# Patient Record
Sex: FEMALE | Race: OTHER
[De-identification: names, ages, dates, MRNs, and addresses within clinical notes are randomized per-mention and may not be internally consistent; named-entity substitution may affect disease eponyms.]

---

## 2018-02-08 ENCOUNTER — HOSPITAL ENCOUNTER (OUTPATIENT)
Dept: HOSPITAL 31 - C.SDS | Age: 73
Discharge: HOME | End: 2018-02-08
Attending: OBSTETRICS & GYNECOLOGY
Payer: MEDICARE

## 2018-02-08 VITALS — OXYGEN SATURATION: 100 %

## 2018-02-08 VITALS
RESPIRATION RATE: 18 BRPM | SYSTOLIC BLOOD PRESSURE: 137 MMHG | TEMPERATURE: 97 F | DIASTOLIC BLOOD PRESSURE: 90 MMHG | HEART RATE: 63 BPM

## 2018-02-08 VITALS — BODY MASS INDEX: 25.6 KG/M2

## 2018-02-08 DIAGNOSIS — N95.0: Primary | ICD-10-CM

## 2018-02-08 DIAGNOSIS — N85.8: ICD-10-CM

## 2018-02-08 DIAGNOSIS — Z85.3: ICD-10-CM

## 2018-02-08 DIAGNOSIS — E11.9: ICD-10-CM

## 2018-02-08 PROCEDURE — 58558 HYSTEROSCOPY BIOPSY: CPT

## 2018-02-08 PROCEDURE — 88305 TISSUE EXAM BY PATHOLOGIST: CPT

## 2018-02-08 PROCEDURE — 82948 REAGENT STRIP/BLOOD GLUCOSE: CPT

## 2018-02-08 NOTE — [id number].[id number]
Surgeon's Initial Post Op Note





- Surgeon's Notes


Surgeon: Nay Herrera MD


Assistant: none


Type of Anesthesia: General LMA


Pre-Operative Diagnosis: Post menopausal bleeding, history of bresat cancner


Operative Findings: small anteverted uterus, 6cm, bilaterl ostia visluzed, 

small ciruclar type fere floatsing lesion removed, cervical stenosis,


Post-Operative Diagnosis: same as above


Operation Performed: Operative hyseroscopy, fractional diation and currettage


Specimen/Specimens Removed: endocervical currettings, endometrial currettins, 

endometrial lesion


Estimated Blood Loss: EBL {In ML}: 5


Blood Products Given: N/A


Drains Used: No Drains


Post-Op Condition: Good


Date of Surgery/Procedure: 02/08/18


Time of Surgery/Procedure: 11:00

## 2018-02-09 NOTE — OP
PROCEDURE DATE:  02/08/2018



SURGEON:  Nay Herrera MD



ASSISTANT:  None.



TYPE OF ANESTHESIA:  General LMA.



PREOPERATIVE DIAGNOSIS:  Postmenopausal bleeding, history of breast cancer.



POSTOPERATIVE DIAGNOSIS:  Postmenopausal bleeding, history of breast

cancer.



OPERATIVE FINDINGS:  _____ bilateral ostia visualized, _____.



OPERATION PERFORMED:  Operative hysteroscopy with fractional dilation and

curettage.



SPECIMENS:  Endocervical curettings, endometrial curettings, and

endometrial lesion.



ESTIMATED BLOOD LOSS:  500 mL.



BLOOD PRODUCTS:  None.



COMPLICATIONS:  None.



DESCRIPTION OF PROCEDURE:  The patient was taken to the operating room

where she was given general anesthesia and once it was found to be

adequate, she was placed on the operating room table in a dorsal supine

position with legs supported using stirrups.  The patient was then prepped

and draped in the usual sterile fashion.  A time-out was confirmed correct

patient and correct procedure.  Endocervical curettings were obtained with

a Kevorkian curette through the cervical stenosis and sent to pathology on

[facility].  Following this, the cervix was sequentially dilated and uterus was

found to be  sounded to 5 cm.  Following this, the cervix was sequentially

dilated with a Harrison dilator to allow for introduction of a 5 mm

hysteroscope under direct visualization using normal saline as the

distention media.  There was a small free-floating lesion noted within the

cavity that was isolated, removed and sent to pathology, and there was

bilateral ostia visualized with no other gross masses.  A gentle curettage

was done with 360 degrees, specimen sent as endometrial curettings.   All

instruments were removed.  There was good hemostasis noted.  At the end of

the procedure, all needle, sponge, and instrument counts were noted and

correct x2.  The patient tolerated the procedure well and was transferred

to the recovery room in stable condition.





__________________________________________

Nay Herrera MD



DD:  02/08/2018 15:29:44

DT:  02/09/2018 0:27:53

Job # [id number]